# Patient Record
Sex: FEMALE | Race: WHITE | Employment: PART TIME | URBAN - METROPOLITAN AREA
[De-identification: names, ages, dates, MRNs, and addresses within clinical notes are randomized per-mention and may not be internally consistent; named-entity substitution may affect disease eponyms.]

---

## 2020-01-13 ENCOUNTER — TRANSFERRED RECORDS (OUTPATIENT)
Dept: HEALTH INFORMATION MANAGEMENT | Facility: CLINIC | Age: 55
End: 2020-01-13

## 2020-01-13 LAB
HPV ABSTRACT: NORMAL
PAP-ABSTRACT: NORMAL

## 2020-01-23 ENCOUNTER — TRANSFERRED RECORDS (OUTPATIENT)
Dept: HEALTH INFORMATION MANAGEMENT | Facility: CLINIC | Age: 55
End: 2020-01-23

## 2020-01-31 ENCOUNTER — TRANSFERRED RECORDS (OUTPATIENT)
Dept: HEALTH INFORMATION MANAGEMENT | Facility: CLINIC | Age: 55
End: 2020-01-31

## 2020-02-07 ENCOUNTER — MEDICAL CORRESPONDENCE (OUTPATIENT)
Dept: HEALTH INFORMATION MANAGEMENT | Facility: CLINIC | Age: 55
End: 2020-02-07

## 2020-02-21 NOTE — TELEPHONE ENCOUNTER
ONCOLOGY INTAKE: Records Information      APPT INFORMATION: 04MAR20 Dr. Tamie Leach  Referring provider:  Dr. Francisca Faria  Referring provider s clinic:  [facility]  Reason for visit/diagnosis:  Vaginal spotting after hysterectomy  Has patient been notified of appointment date and time?: Yes    RECORDS INFORMATION:  Were the records received with the referral (via [employer])? NO    Has patient been seen for any external appt for this diagnosis? Yes    If yes, where? [facility]    Has patient had any imaging or procedures outside of [facility] for this condition? Yes      If Yes, where? [facility]    ADDITIONAL INFORMATION:  None

## 2020-02-21 NOTE — TELEPHONE ENCOUNTER
RECORDS STATUS - ALL OTHER DIAGNOSIS      RECORDS RECEIVED FROM: [facility]   DATE RECEIVED: 02/21/2020   NOTES STATUS DETAILS   OFFICE NOTE from referring provider YES ILIA-[facility]  01/31/2020   OFFICE NOTE from medical oncologist NA    DISCHARGE SUMMARY from hospital YES [facility]IA-[facility]  12/09/2008   DISCHARGE REPORT from the ER YES [facility]  09/25/2015 05/21/2015 06/02/2013   OPERATIVE REPORT YES [facility]IA-[facility]  12/09/2008   MEDICATION LIST YES CE   CLINICAL TRIAL TREATMENTS TO DATE NA    LABS YES CE   PATHOLOGY REPORTS YES CE-01/31/2020   ANYTHING RELATED TO DIAGNOSIS NA    GENONOMIC TESTING NA    TYPE:     IMAGING (NEED IMAGES & REPORT) YES    ULTRASOUND YES IN PACS     Action    Action Taken REQUEST SENT TO FOR [facility] 02/21/2020

## 2020-03-04 ENCOUNTER — PRE VISIT (OUTPATIENT)
Dept: ONCOLOGY | Facility: CLINIC | Age: 55
End: 2020-03-04

## 2020-03-04 ENCOUNTER — APPOINTMENT (OUTPATIENT)
Dept: LAB | Facility: CLINIC | Age: 55
End: 2020-03-04
Payer: COMMERCIAL

## 2020-03-04 ENCOUNTER — ONCOLOGY VISIT (OUTPATIENT)
Dept: ONCOLOGY | Facility: CLINIC | Age: 55
End: 2020-03-04
Attending: OBSTETRICS & GYNECOLOGY
Payer: COMMERCIAL

## 2020-03-04 VITALS
OXYGEN SATURATION: 99 % | HEART RATE: 89 BPM | BODY MASS INDEX: 35.82 KG/M2 | HEIGHT: 66 IN | DIASTOLIC BLOOD PRESSURE: 88 MMHG | WEIGHT: 222.9 LBS | SYSTOLIC BLOOD PRESSURE: 138 MMHG | TEMPERATURE: 97.6 F | RESPIRATION RATE: 16 BRPM

## 2020-03-04 DIAGNOSIS — N93.9 VAGINAL BLEEDING: Primary | ICD-10-CM

## 2020-03-04 LAB — FSH SERPL-ACNC: 82.4 IU/L

## 2020-03-04 PROCEDURE — 99205 OFFICE O/P NEW HI 60 MIN: CPT | Mod: ZP | Performed by: OBSTETRICS & GYNECOLOGY

## 2020-03-04 PROCEDURE — 36415 COLL VENOUS BLD VENIPUNCTURE: CPT | Performed by: OBSTETRICS & GYNECOLOGY

## 2020-03-04 PROCEDURE — 83001 ASSAY OF GONADOTROPIN (FSH): CPT | Performed by: OBSTETRICS & GYNECOLOGY

## 2020-03-04 PROCEDURE — G0463 HOSPITAL OUTPT CLINIC VISIT: HCPCS | Mod: ZF

## 2020-03-04 RX ORDER — MISOPROSTOL 200 UG/1
TABLET ORAL
COMMUNITY
Start: 2020-01-29 | End: 2020-03-04

## 2020-03-04 RX ORDER — DORZOLAMIDE HYDROCHLORIDE AND TIMOLOL MALEATE 20; 5 MG/ML; MG/ML
SOLUTION/ DROPS OPHTHALMIC
COMMUNITY
Start: 2019-07-26

## 2020-03-04 RX ORDER — DICLOFENAC SODIUM 1 MG/ML
1 SOLUTION/ DROPS OPHTHALMIC
COMMUNITY
Start: 2019-04-17 | End: 2020-03-04

## 2020-03-04 ASSESSMENT — MIFFLIN-ST. JEOR: SCORE: 1625.69

## 2020-03-04 ASSESSMENT — PAIN SCALES - GENERAL: PAINLEVEL: NO PAIN (0)

## 2020-03-04 NOTE — PROGRESS NOTES
GYNECOLOGIC  ONCOLOGY CONSULT    Referring provider:    Referred Self, MD  No address on file   RE: Saskia Harper  : 1965  KULDEEP: 3/4/2020    CC: Post menopausal vaginal bleeding    HPI: Ms Saskia Harper is a 54 year old female who presents for consultation regarding post menopausal vaginal bleeding with history of supracervical hysterectomy.    She reports one episode of vaginal bleeding that lasted for few hours. Prior to the that episode she noted mild abdominal pain.    She underwent a supracervical hysterectomy  and would have monthly spotting, mild that stopped when she thought she went through menopause. Occ has hot flashes.    In review of operative and surgical pathology for 2018- surgery noted adhesion of uterus anterior abdominal wall and omentum, morcellation documented on pathology result    Outside Pathology   DIAGNOSIS  UTERUS, SUPRACERVICAL HYSTERECTOMY:  1. Cervix:                Histologically unremarkable  2. Endometrium:           Proliferative endometrium  3. Myometrium:            Histologically unremarkable  4. Uterine serosa:        Histologically unremarkable  5. No malignancy seen      Mother with breast cancer 40's and maternal aunt mid 30's ( genetic test negative per patient)    Treatment History    20 Pelvic US  FINDINGS:   The uterus is surgically absent.   The cervix is identified.  There is a small cluster of echogenic foci near   internal os.  The right ovary is normal in appearance and measures 2.3 X 2.3 X 1.0 cm.  The left ovary is normal in appearance and measures 1.7 X 1.8 X 0.8 cm.  Free fluid in the cul de sac: None    20  A) ENDOMETRIUM, BIOPSY:  1. Predominantly mucus  2. Rare fragments of benign endocervical and ectocervical tissues  3. Negative for definitive endometrial tissue and polyp  4. Negative for glandular neoplasia, squamous intraepithelial lesion, and malignancy    OBGYN history and Health Maintenance:    Last Pap Smear:  "1/2020 Negative, HPV negative  Last Mammogram:11/2019 Benign      Review of Systems:  Answers for HPI/ROS submitted by the patient on 3/3/2020   General Symptoms: No  Skin Symptoms: No  HENT Symptoms: No  EYE SYMPTOMS: No  HEART SYMPTOMS: No  LUNG SYMPTOMS: No  INTESTINAL SYMPTOMS: No  URINARY SYMPTOMS: No  GYNECOLOGIC SYMPTOMS: No  BREAST SYMPTOMS: No  SKELETAL SYMPTOMS: No  BLOOD SYMPTOMS: No  NERVOUS SYSTEM SYMPTOMS: No  MENTAL HEALTH SYMPTOMS: No        Past Medical History:   Diagnosis Date     Cataract      Endometriosis        Past Surgical History:   Procedure Laterality Date     C/SECTION, LOW TRANSVERSE      x 2     DILATION AND CURETTAGE       LAPAROSCOPIC HYSTERECTOMY SUPRACERVICAL            Current Outpatient Medications   Medication Sig Dispense Refill     dorzolamide-timolol (COSOPT) 2-0.5 % ophthalmic solution INSTILL 1 DROP INTO THE LEFT EYE BID UTD           No Known Allergies    Social History:  Social History     Tobacco Use     Smoking status: Never Smoker     Smokeless tobacco: Never Used   Substance Use Topics     Alcohol use: Not on file         Family History:   The patient's family history is notable for pre-menopausal breast cancer in Mom and maternal Aunt  Family History   Problem Relation Age of Onset     Breast Cancer Mother      Breast Cancer Maternal Aunt          Physical Exam:     /88 (BP Location: Left arm, Patient Position: Chair, Cuff Size: Adult Regular)   Pulse 89   Temp 97.6  F (36.4  C) (Oral)   Resp 16   Ht 1.665 m (5' 5.55\")   Wt 101.1 kg (222 lb 14.4 oz)   SpO2 99%   BMI 36.47 kg/m    Body mass index is 36.47 kg/m .    General: Alert and oriented, no acute distress  Psych: Mood stable  GI: No distention. No masses. No hernia.   Lymph: No enlarge lymph nodes in neck or groin  : Normal external genitalia. Cervix small, atrophic no lesions, on bimanual exam mobile small cervix, no nodularity      Assessment:    Saskia Harper is a 54 year old woman with a " new diagnosis of postmenopausal bleeding with past surgical history of supracervical hysterectomy.    Family History of Breast Cancer    Plan:     1.)   I reviewed the recent Pelvic US and pathology and discussed the results with Vero.  Differential diagnosis includes atrophy vs retained endometrial tissue vs cervical polyp although the pelvic US was negative.    For further evaluation we will obtain CT scan abdomen/pelvis to exclude any retained uterine tissue    If negative, patient is considering the risk and benefit of undergoing trachelectomy.    She will follow up in 3 weeks to discuss possible surgery versus observation.      2.) Genetic risk factors were assessed: no current test needed    3.) Labs and/or tests ordered include:  ELSA Leach M.D., MPH,  F.A.C.O.G.  Division of Gynecologic Oncology      A total of 60 minutes was spent with the patient, > 50% of time was spent in counseling the patient and/or treatment planning.

## 2020-03-04 NOTE — LETTER
3/4/2020       RE: Saskia Harper  [address] [address]     Dear Colleague,    Thank you for referring your patient, Saskia Harper, to the [facility]. Please see a copy of my visit note below.    GYNECOLOGIC  ONCOLOGY CONSULT    Referring provider:    Referred Self, MD  No address on file   RE: Saskia Harper  : 1965  KULDEEP: 3/4/2020    CC: Post menopausal vaginal bleeding    HPI: Ms Saskia Harper is a 54 year old female who presents for consultation regarding post menopausal vaginal bleeding with history of supracervical hysterectomy.    She reports one episode of vaginal bleeding that lasted for few hours. Prior to the that episode she noted mild abdominal pain.    She underwent a supracervical hysterectomy  and would have monthly spotting, mild that stopped when she thought she went through menopause. Occ has hot flashes.    In review of operative and surgical pathology for 2018- surgery noted adhesion of uterus anterior abdominal wall and omentum, morcellation documented on pathology result    Outside Pathology   DIAGNOSIS  UTERUS, SUPRACERVICAL HYSTERECTOMY:  1. Cervix:                Histologically unremarkable  2. Endometrium:           Proliferative endometrium  3. Myometrium:            Histologically unremarkable  4. Uterine serosa:        Histologically unremarkable  5. No malignancy seen      Mother with breast cancer 40's and maternal aunt mid 30's ( genetic test negative per patient)    Treatment History    20 Pelvic US  FINDINGS:   The uterus is surgically absent.   The cervix is identified.  There is a small cluster of echogenic foci near   internal os.  The right ovary is normal in appearance and measures 2.3 X 2.3 X 1.0 cm.  The left ovary is normal in appearance and measures 1.7 X 1.8 X 0.8 cm.  Free fluid in the cul de sac: None    20  A) ENDOMETRIUM, BIOPSY:  1. Predominantly mucus  2. Rare fragments of benign  "endocervical and ectocervical tissues  3. Negative for definitive endometrial tissue and polyp  4. Negative for glandular neoplasia, squamous intraepithelial lesion, and malignancy    OBGYN history and Health Maintenance:    Last Pap Smear: 2020 Negative, HPV negative  Last Mammogram:2019 Benign      Review of Systems:  Answers for HPI/ROS submitted by the patient on 3/3/2020   General Symptoms: No  Skin Symptoms: No  HENT Symptoms: No  EYE SYMPTOMS: No  HEART SYMPTOMS: No  LUNG SYMPTOMS: No  INTESTINAL SYMPTOMS: No  URINARY SYMPTOMS: No  GYNECOLOGIC SYMPTOMS: No  BREAST SYMPTOMS: No  SKELETAL SYMPTOMS: No  BLOOD SYMPTOMS: No  NERVOUS SYSTEM SYMPTOMS: No  MENTAL HEALTH SYMPTOMS: No        Past Medical History:   Diagnosis Date     Cataract      Endometriosis        Past Surgical History:   Procedure Laterality Date     C/SECTION, LOW TRANSVERSE      x 2     DILATION AND CURETTAGE       LAPAROSCOPIC HYSTERECTOMY SUPRACERVICAL            Current Outpatient Medications   Medication Sig Dispense Refill     dorzolamide-timolol (COSOPT) 2-0.5 % ophthalmic solution INSTILL 1 DROP INTO THE LEFT EYE BID UTD           No Known Allergies    Social History:  Social History     Tobacco Use     Smoking status: Never Smoker     Smokeless tobacco: Never Used   Substance Use Topics     Alcohol use: Not on file         Family History:   The patient's family history is notable for pre-menopausal breast cancer in Mom and maternal Aunt  Family History   Problem Relation Age of Onset     Breast Cancer Mother      Breast Cancer Maternal Aunt          Physical Exam:     /88 (BP Location: Left arm, Patient Position: Chair, Cuff Size: Adult Regular)   Pulse 89   Temp 97.6  F (36.4  C) (Oral)   Resp 16   Ht 1.665 m (5' 5.55\")   Wt 101.1 kg (222 lb 14.4 oz)   SpO2 99%   BMI 36.47 kg/m     Body mass index is 36.47 kg/m .    General: Alert and oriented, no acute distress  Psych: Mood stable  GI: No distention. No masses. " No hernia.   Lymph: No enlarge lymph nodes in neck or groin  : Normal external genitalia. Cervix small, atrophic no lesions, on bimanual exam mobile small cervix, no nodularity      Assessment:    Saskia Harper is a 54 year old woman with a new diagnosis of postmenopausal bleeding with past surgical history of supracervical hysterectomy.    Family History of Breast Cancer    Plan:     1.)   I reviewed the recent Pelvic US and pathology and discussed the results with Vero.  Differential diagnosis includes atrophy vs retained endometrial tissue vs cervical polyp although the pelvic US was negative.    For further evaluation we will obtain CT scan abdomen/pelvis to exclude any retained uterine tissue    If negative, patient is considering the risk and benefit of undergoing trachelectomy.    She will follow up in 3 weeks to discuss possible surgery versus observation.      2.) Genetic risk factors were assessed: no current test needed    3.) Labs and/or tests ordered include:  ELSA Leach M.D., MPH,  F.A.C.O.G.  Division of Gynecologic Oncology      A total of 60 minutes was spent with the patient, > 50% of time was spent in counseling the patient and/or treatment planning.        Again, thank you for allowing me to participate in the care of your patient.      Sincerely,    Tamie Leach MD

## 2020-03-17 ENCOUNTER — ANCILLARY PROCEDURE (OUTPATIENT)
Dept: CT IMAGING | Facility: CLINIC | Age: 55
End: 2020-03-17
Attending: OBSTETRICS & GYNECOLOGY
Payer: COMMERCIAL

## 2020-03-17 DIAGNOSIS — N93.9 VAGINAL BLEEDING: ICD-10-CM

## 2020-03-17 LAB — RADIOLOGIST FLAGS: NORMAL

## 2020-03-17 RX ORDER — IOPAMIDOL 755 MG/ML
135 INJECTION, SOLUTION INTRAVASCULAR ONCE
Status: COMPLETED | OUTPATIENT
Start: 2020-03-17 | End: 2020-03-17

## 2020-03-17 RX ADMIN — IOPAMIDOL 135 ML: 755 INJECTION, SOLUTION INTRAVASCULAR at 13:54

## 2020-03-18 ENCOUNTER — TELEPHONE (OUTPATIENT)
Dept: ONCOLOGY | Facility: CLINIC | Age: 55
End: 2020-03-18

## 2020-03-18 NOTE — TELEPHONE ENCOUNTER
"[facility] calling to triage to report incidental finding from CT scan from 3/17/20.  \"Consider Follow Up: Lung nodule\"    Routed to Amanda Syed, RNCC for Dr Leach  "

## 2020-03-19 ENCOUNTER — VIRTUAL VISIT (OUTPATIENT)
Dept: ONCOLOGY | Facility: CLINIC | Age: 55
End: 2020-03-19
Attending: OBSTETRICS & GYNECOLOGY
Payer: COMMERCIAL

## 2020-03-19 DIAGNOSIS — N95.0 PMB (POSTMENOPAUSAL BLEEDING): Primary | ICD-10-CM

## 2020-03-19 DIAGNOSIS — N73.6 PELVIC ADHESIONS: ICD-10-CM

## 2020-03-19 PROCEDURE — 40000114 ZZH STATISTIC NO CHARGE CLINIC VISIT

## 2020-03-19 PROCEDURE — 99213 OFFICE O/P EST LOW 20 MIN: CPT | Mod: TEL | Performed by: OBSTETRICS & GYNECOLOGY

## 2020-03-19 NOTE — PROGRESS NOTES
"Saskia Harper is a 54 year old female who is being evaluated via a billable telephone visit.      The patient has been notified of following:     \"This telephone visit will be conducted via a call between you and your physician/provider. We have found that certain health care needs can be provided without the need for a physical exam.  This service lets us provide the care you need with a short phone conversation.  If a prescription is necessary we can send it directly to your pharmacy.  If lab work is needed we can place an order for that and you can then stop by our lab to have the test done at a later time.    If during the course of the call the physician/provider feels a telephone visit is not appropriate, you will not be charged for this service.\"     Saskia Harper complains of    Chief Complaint   Patient presents with     Telephone     Vaginal bleeding        I have reviewed and updated the patient's Past Medical History, Social History, Family History and Medication List.    ALLERGIES  Patient has no known allergies.     Concerns: Patient has no new concerns.    Refill: N/A    Rodney Taylor EMT    Additional provider notes: Tamie Leach MD    CC: Post Menopausal bleeding     HPI: Ms Saskia Harper is a 54 year old female laparoscopic supracervical hysterectomy 2008 with report of post-menopausal bleeding.    Patient is following up via phone e-visit. Since her last visit she reports feeling and no new concerns.    She underwent a supracervical hysterectomy 2008 and would have monthly spotting, mild that stopped when she thought she went through menopause. Occ has hot flashes.     She reports one episode of vaginal bleeding that lasted for few hours on christmas eve 2019. This was small blood with mucous and no other episodes since that time.       In review of operative and surgical pathology for 12/2018- surgery noted adhesion of uterus anterior abdominal wall and omentum, morcellation " documented on pathology result     Outside Pathology  2018  DIAGNOSIS  UTERUS, SUPRACERVICAL HYSTERECTOMY:  1. Cervix:                Histologically unremarkable  2. Endometrium:           Proliferative endometrium  3. Myometrium:            Histologically unremarkable  4. Uterine serosa:        Histologically unremarkable  5. No malignancy seen      20 Pelvic US  FINDINGS:   The uterus is surgically absent.   The cervix is identified.  There is a small cluster of echogenic foci near   internal os.  The right ovary is normal in appearance and measures 2.3 X 2.3 X 1.0 cm.  The left ovary is normal in appearance and measures 1.7 X 1.8 X 0.8 cm.  Free fluid in the cul de sac: None     20  A) ENDOMETRIUM, BIOPSY:  1. Predominantly mucus  2. Rare fragments of benign endocervical and ectocervical tissues  3. Negative for definitive endometrial tissue and polyp  4. Negative for glandular neoplasia, squamous intraepithelial lesion, and malignancy     OBGYN history and Health Maintenance:    Last Pap Smear: 2020 Negative, HPV negative  Last Mammogram:2019 Benign    3/4/3030 FSH 82.4  3/17/2020 CT abdomen and pelvis  IMPRESSION:   1. Postsurgical changes of supracervical hysterectomy without  appreciable mass. If there is persistent clinical concern, consider  further evaluation with pelvic MRI.  2. Incidentally noted 4 mm nodule in the right middle lobe. Consider  follow-up chest CT in one year if the patient is considered high risk  for lung cancer.     Review of System  Answers for HPI/ROS submitted by the patient on 3/16/2020   General Symptoms: No  Skin Symptoms: No  HENT Symptoms: No  EYE SYMPTOMS: No  HEART SYMPTOMS: No  LUNG SYMPTOMS: No  INTESTINAL SYMPTOMS: No  URINARY SYMPTOMS: No  GYNECOLOGIC SYMPTOMS: No  BREAST SYMPTOMS: No  SKELETAL SYMPTOMS: No  BLOOD SYMPTOMS: No  NERVOUS SYSTEM SYMPTOMS: No  MENTAL HEALTH SYMPTOMS: No          Assessment/Plan:  Ms Saskia Harper is a 54 year old  female Laparoscopic supracervical hysterectomy 2008 with report of one episode of  post-menopausal bleeding.    Work up to date including Pap test, endocervix curettage and CT scan are negative for any new findings.   At this visit I discussed the most recent CT scan finding with the patient over the phone.  In addition the current biopsy is are negative. We also reviewed her history of abdominal pelvic adhesions based on review of the surgical report from 2008.    Patient can return for another evaluation if she has any new episode of bleeding.    CT scan shows a 4 mm incidental finding of right middle love nodule, patient has never been a smoker. No further follow up at this time. Patient made aware of the finding.     Phone call duration:  15 minutes    Tamie Leach M.D., MPH,  F.A.C.O.G.  Division of Gynecologic Oncology        Total time 20 minutes and more than 50 % of the time was spent reviewing data, counseling the patient, discussing treatment options and coordination of care.

## 2020-03-19 NOTE — LETTER
"3/19/2020        RE: Saskia Harper  [address] [facility] [facility] [address]     Dear Colleague,    Thank you for referring your patient, Saskia Harper, to the [facility]. Please see a copy of my visit note below.    Saskia Harper is a 54 year old female who is being evaluated via a billable telephone visit.      The patient has been notified of following:     \"This telephone visit will be conducted via a call between you and your physician/provider. We have found that certain health care needs can be provided without the need for a physical exam.  This service lets us provide the care you need with a short phone conversation.  If a prescription is necessary we can send it directly to your pharmacy.  If lab work is needed we can place an order for that and you can then stop by our lab to have the test done at a later time.    If during the course of the call the physician/provider feels a telephone visit is not appropriate, you will not be charged for this service.\"     Saskia Harper complains of    Chief Complaint   Patient presents with     Telephone     Vaginal bleeding        I have reviewed and updated the patient's Past Medical History, Social History, Family History and Medication List.    ALLERGIES  Patient has no known allergies.     Concerns: Patient has no new concerns.    Refill: N/A    Rodney Taylor, EMT    Additional provider notes: Tamie Leach MD    CC: Post Menopausal bleeding     HPI: Ms Saskia Harper is a 54 year old female laparoscopic supracervical hysterectomy 2008 with report of post-menopausal bleeding.    Patient is following up via phone e-visit. Since her last visit she reports feeling and no new concerns.    She underwent a supracervical hysterectomy 2008 and would have monthly spotting, mild that stopped when she thought she went through menopause. Occ has hot flashes.     She reports one episode of vaginal bleeding that lasted for few hours " on 2019. This was small blood with mucous and no other episodes since that time.       In review of operative and surgical pathology for 2018- surgery noted adhesion of uterus anterior abdominal wall and omentum, morcellation documented on pathology result     Outside Pathology  2018  DIAGNOSIS  UTERUS, SUPRACERVICAL HYSTERECTOMY:  1. Cervix:                Histologically unremarkable  2. Endometrium:           Proliferative endometrium  3. Myometrium:            Histologically unremarkable  4. Uterine serosa:        Histologically unremarkable  5. No malignancy seen      20 Pelvic US  FINDINGS:   The uterus is surgically absent.   The cervix is identified.  There is a small cluster of echogenic foci near   internal os.  The right ovary is normal in appearance and measures 2.3 X 2.3 X 1.0 cm.  The left ovary is normal in appearance and measures 1.7 X 1.8 X 0.8 cm.  Free fluid in the cul de sac: None     20  A) ENDOMETRIUM, BIOPSY:  1. Predominantly mucus  2. Rare fragments of benign endocervical and ectocervical tissues  3. Negative for definitive endometrial tissue and polyp  4. Negative for glandular neoplasia, squamous intraepithelial lesion, and malignancy     OBGYN history and Health Maintenance:    Last Pap Smear: 2020 Negative, HPV negative  Last Mammogram:2019 Benign    3/4/3030 FSH 82.4  3/17/2020 CT abdomen and pelvis  IMPRESSION:   1. Postsurgical changes of supracervical hysterectomy without  appreciable mass. If there is persistent clinical concern, consider  further evaluation with pelvic MRI.  2. Incidentally noted 4 mm nodule in the right middle lobe. Consider  follow-up chest CT in one year if the patient is considered high risk  for lung cancer.     Review of System  Answers for HPI/ROS submitted by the patient on 3/16/2020   General Symptoms: No  Skin Symptoms: No  HENT Symptoms: No  EYE SYMPTOMS: No  HEART SYMPTOMS: No  LUNG SYMPTOMS: No  INTESTINAL  SYMPTOMS: No  URINARY SYMPTOMS: No  GYNECOLOGIC SYMPTOMS: No  BREAST SYMPTOMS: No  SKELETAL SYMPTOMS: No  BLOOD SYMPTOMS: No  NERVOUS SYSTEM SYMPTOMS: No  MENTAL HEALTH SYMPTOMS: No          Assessment/Plan:  Ms Saskia Harper is a 54 year old female Laparoscopic supracervical hysterectomy 2008 with report of one episode of  post-menopausal bleeding.    Work up to date including Pap test, endocervix curettage and CT scan are negative for any new findings.   At this visit I discussed the most recent CT scan finding with the patient over the phone.  In addition the current biopsy is are negative. We also reviewed her history of abdominal pelvic adhesions based on review of the surgical report from 2008.    Patient can return for another evaluation if she has any new episode of bleeding.    CT scan shows a 4 mm incidental finding of right middle love nodule, patient has never been a smoker. No further follow up at this time. Patient made aware of the finding.     Phone call duration:  15 minutes    Tamie Leach M.D., MPH,  F.A.C.O.G.  Division of Gynecologic Oncology        Total time 20 minutes and more than 50 % of the time was spent reviewing data, counseling the patient, discussing treatment options and coordination of care.      Again, thank you for allowing me to participate in the care of your patient.      Sincerely,    Tamie Leach MD

## 2020-03-30 NOTE — PROGRESS NOTES
Order(s) created erroneously. Erroneous order ID: [id number]   Order canceled by: ROLANDO MERCER   Order cancel date/time: 03/30/2020 5:18 PM

## 2020-03-30 NOTE — PROGRESS NOTES
Order(s) created erroneously. Erroneous order ID: [id number]   Order canceled by: ROLANDO MERCER   Order cancel date/time: 03/30/2020 5:19 PM

## 2020-12-14 ENCOUNTER — HEALTH MAINTENANCE LETTER (OUTPATIENT)
Age: 55
End: 2020-12-14

## 2021-10-02 ENCOUNTER — HEALTH MAINTENANCE LETTER (OUTPATIENT)
Age: 56
End: 2021-10-02

## 2022-01-22 ENCOUNTER — HEALTH MAINTENANCE LETTER (OUTPATIENT)
Age: 57
End: 2022-01-22

## 2022-09-03 ENCOUNTER — HEALTH MAINTENANCE LETTER (OUTPATIENT)
Age: 57
End: 2022-09-03

## 2023-04-29 ENCOUNTER — HEALTH MAINTENANCE LETTER (OUTPATIENT)
Age: 58
End: 2023-04-29

## 2024-10-04 NOTE — NURSING NOTE
"Oncology Rooming Note    March 4, 2020 2:05 PM   Saskia Harper is a 54 year old female who presents for:    Chief Complaint   Patient presents with     Oncology Clinic Visit     New; Vaginal Spotting After Hysterectomy     Initial Vitals: /88 (BP Location: Left arm, Patient Position: Chair, Cuff Size: Adult Regular)   Pulse 89   Temp 97.6  F (36.4  C) (Oral)   Resp 16   Ht 1.665 m (5' 5.55\")   Wt 101.1 kg (222 lb 14.4 oz)   SpO2 99%   BMI 36.47 kg/m   Estimated body mass index is 36.47 kg/m  as calculated from the following:    Height as of this encounter: 1.665 m (5' 5.55\").    Weight as of this encounter: 101.1 kg (222 lb 14.4 oz). Body surface area is 2.16 meters squared.  No Pain (0) Comment: Data Unavailable   No LMP recorded. Patient has had a hysterectomy.  Allergies reviewed: Yes  Medications reviewed: Yes    Medications: Medication refills not needed today.  Pharmacy name entered into [facility]: [id number] - [place], [place] - [address] AT [facility] [address]    Clinical concerns: Patient states she had one episode of bleeding in 01/2020 that she noticed in the bathroom but states she has not had any vaginal bleeding since.        Kendra Obrien CMA              "
Pt Aox2, AMS, poor historian, JOSE